# Patient Record
Sex: FEMALE | Race: WHITE
[De-identification: names, ages, dates, MRNs, and addresses within clinical notes are randomized per-mention and may not be internally consistent; named-entity substitution may affect disease eponyms.]

---

## 2017-03-28 ENCOUNTER — HOSPITAL ENCOUNTER (OUTPATIENT)
Dept: HOSPITAL 47 - RADMAMWWP | Age: 77
Discharge: HOME | End: 2017-03-28
Payer: MEDICARE

## 2017-03-28 DIAGNOSIS — Z12.31: Primary | ICD-10-CM

## 2017-03-28 PROCEDURE — 77063 BREAST TOMOSYNTHESIS BI: CPT

## 2017-04-13 NOTE — MM
Reason for exam: screening  (asymptomatic).

Last mammogram was performed 2 years and 5 months ago.



History:

Patient is postmenopausal and history of other cancer.

Family history of breast cancer in sister at age 65.

Benign cyst aspiration of the right breast.



Physical Findings:

A clinical breast exam by your physician is recommended on an annual basis and 

results should be correlated with mammographic findings.



MG 3D Screening Mammo W/Cad

Bilateral CC and MLO view(s) were taken.

Prior study comparison: October 28, 2014, mammogram, performed at [place].  October 22, 2013, mammogram, performed at [place].

The breast tissue is heterogeneously dense. This may lower the sensitivity of 

mammography.  There are few typically benign round calcifications in both breasts.

There is no discrete abnormality.





ASSESSMENT: Benign, BI-RAD 2



RECOMMENDATION:

Routine screening mammogram of both breasts in 1 year.

## 2018-07-14 ENCOUNTER — HOSPITAL ENCOUNTER (EMERGENCY)
Dept: HOSPITAL 47 - EC | Age: 78
LOS: 1 days | Discharge: HOME | End: 2018-07-15
Payer: MEDICARE
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
## 2018-07-14 DIAGNOSIS — E66.9: ICD-10-CM

## 2018-07-14 DIAGNOSIS — R51: ICD-10-CM

## 2018-07-14 DIAGNOSIS — Z79.899: ICD-10-CM

## 2018-07-14 DIAGNOSIS — K21.9: ICD-10-CM

## 2018-07-14 DIAGNOSIS — Z79.891: ICD-10-CM

## 2018-07-14 DIAGNOSIS — M19.90: ICD-10-CM
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
## 2018-07-14 DIAGNOSIS — F41.9: ICD-10-CM

## 2018-07-14 DIAGNOSIS — Z79.52: ICD-10-CM
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
## 2018-07-14 DIAGNOSIS — I10: ICD-10-CM

## 2018-07-14 DIAGNOSIS — M54.2: Primary | ICD-10-CM

## 2018-07-14 PROCEDURE — 72192 CT PELVIS W/O DYE: CPT

## 2018-07-14 PROCEDURE — 72125 CT NECK SPINE W/O DYE: CPT

## 2018-07-14 PROCEDURE — 96372 THER/PROPH/DIAG INJ SC/IM: CPT

## 2018-07-14 PROCEDURE — 99284 EMERGENCY DEPT VISIT MOD MDM: CPT

## 2018-07-14 PROCEDURE — 70450 CT HEAD/BRAIN W/O DYE: CPT

## 2018-07-14 NOTE — ED
Fall HPI





- General


Stated Complaint: FALL


Time Seen by Provider: 18 23:25


Source: patient


Limitations: altered mental status





- History of Present Illness


MD Complaint: fall


-: minutes(s)


When Fall Occurred: 1 hour PTA


Fall Witnessed: no


Place Fall Occurred: home


Loss of Consciousness: unsure


Prolonged Down Time?: unclear


Location: neck, pelvis


Context: tripped/slipped


Associated Symptoms: headache, neck pain





- Related Data


 Home Medications











 Medication  Instructions  Recorded  Confirmed


 


Acyclovir [Zovirax] 400 mg PO BID@0600,1900 08/24/15 09/21/15


 


Cyclobenzaprine [Flexeril] 10 mg PO TID@0600,1200,1800 08/24/15 09/21/15


 


Dexamethasone [Hexadrol] 4 mg PO DAILY 08/24/15 09/21/15


 


Docusate [Colace] 100 mg PO BID 08/24/15 09/21/15


 


Loperamide [Imodium] 2 mg PO DAILY PRN 08/24/15 09/21/15


 


Omeprazole [PriLOSEC] 20 mg PO AC-SUPPER 08/24/15 09/21/15


 


Ondansetron HCl [Zofran] 4 mg PO Q8HR PRN 08/24/15 09/21/15


 


Tamsulosin HCl [Flomax] 0.4 mg PO DAILY 08/24/15 09/21/15


 


ALPRAZolam [Xanax] 0.25 mg PO TID@0600,1300,1900 09/14/15 09/21/15


 


HYDROcodone/APAP 7.5-325MG [Norco 1 tab PO Q6HR PRN 09/14/15 09/21/15





7.5-325]   


 


L.acidoph,Paracasei, B.lactis 1 cap PO DAILY 09/21/15 09/21/15





[Probiotic]   


 


Lisinopril [Zestril] 10 mg PO DAILY 09/21/15 09/21/15


 


oxyCODONE HCL [OxyCONTIN] 60 mg PO Q12HR@0600,1800 09/21/15 09/21/15








 Previous Rx's











 Medication  Instructions  Recorded


 


Cefuroxime Axetil [Ceftin] 500 mg PO BID #10 tablet 09/26/15


 


MORPHINE ORAL SOL CONC 20mg/mL 5 mg PO Q4H PRN #30 ml 09/26/15





[Roxanol Oral Soln Conc 20Mg/ml]  











 Allergies











Allergy/AdvReac Type Severity Reaction Status Date / Time


 


No Known Allergies Allergy   Verified 07/15/18 00:00














Review of Systems


ROS Statement: 


Those systems with pertinent positive or pertinent negative responses have been 

documented in the HPI.





ROS Other: All systems not noted in ROS Statement are negative.


Limitations: ROS unobtainable due to patients medical condition





Past Medical History


Past Medical History: Deep Vein Thrombosis (DVT), GERD/Reflux, Hypertension, 

Osteoarthritis (OA)


Additional Past Medical History / Comment(s): multiple myeloma, sinusitis, 

scatica,dvt rt leg , anxiety


History of Any Multi-Drug Resistant Organisms: None Reported


Past Surgical History: Cholecystectomy, Hernia Repair, Hysterectomy, Joint 

Replacement, Orthopedic Surgery


Additional Past Surgical History / Comment(s): bilateral knee replacement, back 

surgery/injections, colonoscopy/polypectomy, d/t bowel obstruction had bowel 

resection w/ colostomy then had reversal of colostomy , total hysterectomt d

/t endometreosis, abd exploratory sx for multiple lysis of adhesions, mikie knee 

replacements, cataracts-lens implants.


Past Anesthesia/Blood Transfusion Reactions: No Reported Reaction


Past Psychological History: Anxiety


Additional Psychological History / Comment(s): pt has been home about a month 

from [facility], had visiting nurse x2 a week and nurse aid 2x a 

week. pt's  stated pt was getting up with a walker but in past few days 

increasingly weaker and not able to get up.


Smoking Status: Never smoker


Past Alcohol Use History: None Reported


Past Drug Use History: None Reported





- Past Family History


  ** Sister(s)


Family Medical History: Cancer


Additional Family Medical History / Comment(s): breast





  ** Mother


Additional Family Medical History / Comment(s): had heart problems -  in 

her 80's





  ** Father


History Unknown: Yes





General Exam


General appearance: alert, in no apparent distress, obese


Head exam: Present: atraumatic, normocephalic


Neck exam: Present: tenderness.  Absent: full ROM (In cervical collar)


Respiratory exam: Present: normal lung sounds bilaterally.  Absent: respiratory 

distress, wheezes, rales, rhonchi, stridor


Cardiovascular Exam: Present: regular rate, normal rhythm, normal heart sounds.

  Absent: systolic murmur, diastolic murmur, rubs, gallop


GI/Abdominal exam: Present: soft.  Absent: distended, tenderness, guarding, 

rebound, mass


Neurological exam: Present: alert, CN II-XII intact.  Absent: oriented X3 (

Patient is oriented to person and place but could not state the date), motor 

sensory deficit


Skin exam: Present: warm, dry, intact, normal color.  Absent: rash





Course


 Vital Signs











  18





  23:53


 


Temperature 98.3 F


 


Pulse Rate 63


 


Respiratory 18





Rate 


 


Blood Pressure 229/102


 


O2 Sat by Pulse 89 L





Oximetry 














Disposition


Clinical Impression: 


 Fall





Disposition: HOME SELF-CARE


Condition: Poor


Instructions:  Fall Prevention for Older Adults (ED)


Is patient prescribed a controlled substance at d/c from ED?: No


Referrals: 


Tana Serrano MD [Primary Care Provider] - 1-2 days

## 2018-07-15 VITALS — RESPIRATION RATE: 16 BRPM | HEART RATE: 72 BPM | SYSTOLIC BLOOD PRESSURE: 202 MMHG | DIASTOLIC BLOOD PRESSURE: 90 MMHG

## 2018-07-15 VITALS — TEMPERATURE: 98.3 F

## 2018-07-15 NOTE — CT
EXAMINATION TYPE: CT brain cspine wo con

 

DATE OF EXAM: 7/15/2018

 

COMPARISON: CT brain 9/23/2015

 

HISTORY: Fall

 

CT DLP: 1477.20 mGycm

Automated exposure control for dose reduction was used.

 

TECHNIQUE: CT scan of the head and cervical spine are performed without contrast.

 

FINDINGS:   There is mild cerebral cortical atrophy. There is some hypodensity in the periventricular
 white matter. There is no mass effect nor midline shift. There is no sign of intracranial hemorrhage
. There is cranial hyperostosis.

 

The cervical vertebra have fairly normal spacing and alignment. There is osteopenia. Facet joints sohan
ear intact. There is no compression fracture. The skull base appears intact.

 

IMPRESSION:

Cerebral atrophy and chronic small vessel ischemia. No significant change.

 

Minimal spondylotic changes in the cervical spine. Osteopenia. No fracture.

## 2018-07-15 NOTE — CT
EXAMINATION TYPE: CT pelvis wo con

 

DATE OF EXAM: 7/15/2018

 

COMPARISON: 9/22/2015

 

HISTORY: fall, pelvic pain, bilateral hip pain

 

CT DLP: 674.10 mGycm

Automated exposure control for dose reduction was used.

 

FINDINGS: 

There is osteopenia. There is facet arthropathy in the lower lumbar spine with spur formation. There 
is some compression deformity of L4 vertebra. This is unchanged.

 

Sacroiliac joints are intact. Sacrum and coccyx appear intact. The pelvic ring is intact. There is mi
ld acetabular spurring. The proximal femurs are intact. I see no pelvic fracture. Bladder distends sm
oothly. There is no free fluid in the pelvis. There is previous surgery on the sigmoid colon. There i
s umbilical hernia that contains fat.

 

IMPRESSION: 

OSTEOPENIA. OLD MILD L4 COMPRESSION FRACTURE. NO ACUTE FRACTURE SEEN.